# Patient Record
Sex: MALE | Race: WHITE | NOT HISPANIC OR LATINO | URBAN - METROPOLITAN AREA
[De-identification: names, ages, dates, MRNs, and addresses within clinical notes are randomized per-mention and may not be internally consistent; named-entity substitution may affect disease eponyms.]

---

## 2020-07-30 ENCOUNTER — INPATIENT (INPATIENT)
Facility: HOSPITAL | Age: 35
LOS: 1 days | Discharge: HOME | End: 2020-08-01
Attending: HOSPITALIST | Admitting: HOSPITALIST
Payer: COMMERCIAL

## 2020-07-30 VITALS
SYSTOLIC BLOOD PRESSURE: 131 MMHG | DIASTOLIC BLOOD PRESSURE: 101 MMHG | HEART RATE: 119 BPM | RESPIRATION RATE: 32 BRPM | OXYGEN SATURATION: 100 %

## 2020-07-30 DIAGNOSIS — Z90.89 ACQUIRED ABSENCE OF OTHER ORGANS: Chronic | ICD-10-CM

## 2020-07-30 LAB
ALBUMIN SERPL ELPH-MCNC: 4.4 G/DL — SIGNIFICANT CHANGE UP (ref 3.5–5.2)
ALP SERPL-CCNC: 77 U/L — SIGNIFICANT CHANGE UP (ref 30–115)
ALT FLD-CCNC: 29 U/L — SIGNIFICANT CHANGE UP (ref 0–41)
ANION GAP SERPL CALC-SCNC: 11 MMOL/L — SIGNIFICANT CHANGE UP (ref 7–14)
AST SERPL-CCNC: 23 U/L — SIGNIFICANT CHANGE UP (ref 0–41)
BASE EXCESS BLDV CALC-SCNC: -0.6 MMOL/L — SIGNIFICANT CHANGE UP (ref -2–2)
BASOPHILS # BLD AUTO: 0.11 K/UL — SIGNIFICANT CHANGE UP (ref 0–0.2)
BASOPHILS NFR BLD AUTO: 1 % — SIGNIFICANT CHANGE UP (ref 0–1)
BILIRUB SERPL-MCNC: 0.3 MG/DL — SIGNIFICANT CHANGE UP (ref 0.2–1.2)
BUN SERPL-MCNC: 15 MG/DL — SIGNIFICANT CHANGE UP (ref 10–20)
CA-I SERPL-SCNC: 1.21 MMOL/L — SIGNIFICANT CHANGE UP (ref 1.12–1.3)
CALCIUM SERPL-MCNC: 9.3 MG/DL — SIGNIFICANT CHANGE UP (ref 8.5–10.1)
CHLORIDE SERPL-SCNC: 104 MMOL/L — SIGNIFICANT CHANGE UP (ref 98–110)
CO2 SERPL-SCNC: 25 MMOL/L — SIGNIFICANT CHANGE UP (ref 17–32)
CREAT SERPL-MCNC: 1.2 MG/DL — SIGNIFICANT CHANGE UP (ref 0.7–1.5)
EOSINOPHIL # BLD AUTO: 0.68 K/UL — SIGNIFICANT CHANGE UP (ref 0–0.7)
EOSINOPHIL NFR BLD AUTO: 6.4 % — SIGNIFICANT CHANGE UP (ref 0–8)
GAS PNL BLDV: 141 MMOL/L — SIGNIFICANT CHANGE UP (ref 136–145)
GAS PNL BLDV: SIGNIFICANT CHANGE UP
GLUCOSE SERPL-MCNC: 133 MG/DL — HIGH (ref 70–99)
HCO3 BLDV-SCNC: 26 MMOL/L — SIGNIFICANT CHANGE UP (ref 22–29)
HCT VFR BLD CALC: 43.3 % — SIGNIFICANT CHANGE UP (ref 42–52)
HCT VFR BLDA CALC: 46.8 % — HIGH (ref 34–44)
HGB BLD CALC-MCNC: 15.3 G/DL — SIGNIFICANT CHANGE UP (ref 14–18)
HGB BLD-MCNC: 14.8 G/DL — SIGNIFICANT CHANGE UP (ref 14–18)
HOROWITZ INDEX BLDV+IHG-RTO: 100 — SIGNIFICANT CHANGE UP
IMM GRANULOCYTES NFR BLD AUTO: 0.4 % — HIGH (ref 0.1–0.3)
LACTATE BLDV-MCNC: 1.6 MMOL/L — SIGNIFICANT CHANGE UP (ref 0.5–1.6)
LYMPHOCYTES # BLD AUTO: 2.53 K/UL — SIGNIFICANT CHANGE UP (ref 1.2–3.4)
LYMPHOCYTES # BLD AUTO: 23.8 % — SIGNIFICANT CHANGE UP (ref 20.5–51.1)
MCHC RBC-ENTMCNC: 31.2 PG — HIGH (ref 27–31)
MCHC RBC-ENTMCNC: 34.2 G/DL — SIGNIFICANT CHANGE UP (ref 32–37)
MCV RBC AUTO: 91.2 FL — SIGNIFICANT CHANGE UP (ref 80–94)
MONOCYTES # BLD AUTO: 0.61 K/UL — HIGH (ref 0.1–0.6)
MONOCYTES NFR BLD AUTO: 5.7 % — SIGNIFICANT CHANGE UP (ref 1.7–9.3)
NEUTROPHILS # BLD AUTO: 6.65 K/UL — HIGH (ref 1.4–6.5)
NEUTROPHILS NFR BLD AUTO: 62.7 % — SIGNIFICANT CHANGE UP (ref 42.2–75.2)
NRBC # BLD: 0 /100 WBCS — SIGNIFICANT CHANGE UP (ref 0–0)
PCO2 BLDV: 49 MMHG — SIGNIFICANT CHANGE UP (ref 41–51)
PH BLDV: 7.33 — SIGNIFICANT CHANGE UP (ref 7.26–7.43)
PLATELET # BLD AUTO: 281 K/UL — SIGNIFICANT CHANGE UP (ref 130–400)
PO2 BLDV: 36 MMHG — SIGNIFICANT CHANGE UP (ref 20–40)
POTASSIUM BLDV-SCNC: 4.1 MMOL/L — SIGNIFICANT CHANGE UP (ref 3.3–5.6)
POTASSIUM SERPL-MCNC: 4.2 MMOL/L — SIGNIFICANT CHANGE UP (ref 3.5–5)
POTASSIUM SERPL-SCNC: 4.2 MMOL/L — SIGNIFICANT CHANGE UP (ref 3.5–5)
PROT SERPL-MCNC: 7 G/DL — SIGNIFICANT CHANGE UP (ref 6–8)
RBC # BLD: 4.75 M/UL — SIGNIFICANT CHANGE UP (ref 4.7–6.1)
RBC # FLD: 11.8 % — SIGNIFICANT CHANGE UP (ref 11.5–14.5)
SAO2 % BLDV: 68 % — SIGNIFICANT CHANGE UP
SARS-COV-2 RNA SPEC QL NAA+PROBE: SIGNIFICANT CHANGE UP
SODIUM SERPL-SCNC: 140 MMOL/L — SIGNIFICANT CHANGE UP (ref 135–146)
WBC # BLD: 10.62 K/UL — SIGNIFICANT CHANGE UP (ref 4.8–10.8)
WBC # FLD AUTO: 10.62 K/UL — SIGNIFICANT CHANGE UP (ref 4.8–10.8)

## 2020-07-30 PROCEDURE — 99223 1ST HOSP IP/OBS HIGH 75: CPT

## 2020-07-30 PROCEDURE — 99291 CRITICAL CARE FIRST HOUR: CPT

## 2020-07-30 PROCEDURE — 71045 X-RAY EXAM CHEST 1 VIEW: CPT | Mod: 26

## 2020-07-30 PROCEDURE — 93970 EXTREMITY STUDY: CPT | Mod: 26

## 2020-07-30 RX ORDER — IPRATROPIUM/ALBUTEROL SULFATE 18-103MCG
3 AEROSOL WITH ADAPTER (GRAM) INHALATION ONCE
Refills: 0 | Status: COMPLETED | OUTPATIENT
Start: 2020-07-30 | End: 2020-07-30

## 2020-07-30 RX ORDER — EPINEPHRINE 0.3 MG/.3ML
0.3 INJECTION INTRAMUSCULAR; SUBCUTANEOUS ONCE
Refills: 0 | Status: COMPLETED | OUTPATIENT
Start: 2020-07-30 | End: 2020-07-30

## 2020-07-30 RX ORDER — ENOXAPARIN SODIUM 100 MG/ML
40 INJECTION SUBCUTANEOUS DAILY
Refills: 0 | Status: DISCONTINUED | OUTPATIENT
Start: 2020-07-30 | End: 2020-08-01

## 2020-07-30 RX ORDER — PANTOPRAZOLE SODIUM 20 MG/1
40 TABLET, DELAYED RELEASE ORAL
Refills: 0 | Status: DISCONTINUED | OUTPATIENT
Start: 2020-07-30 | End: 2020-08-01

## 2020-07-30 RX ORDER — BUDESONIDE AND FORMOTEROL FUMARATE DIHYDRATE 160; 4.5 UG/1; UG/1
2 AEROSOL RESPIRATORY (INHALATION)
Refills: 0 | Status: DISCONTINUED | OUTPATIENT
Start: 2020-07-30 | End: 2020-08-01

## 2020-07-30 RX ORDER — CHLORHEXIDINE GLUCONATE 213 G/1000ML
1 SOLUTION TOPICAL
Refills: 0 | Status: DISCONTINUED | OUTPATIENT
Start: 2020-07-30 | End: 2020-08-01

## 2020-07-30 RX ORDER — IPRATROPIUM/ALBUTEROL SULFATE 18-103MCG
3 AEROSOL WITH ADAPTER (GRAM) INHALATION EVERY 6 HOURS
Refills: 0 | Status: DISCONTINUED | OUTPATIENT
Start: 2020-07-30 | End: 2020-08-01

## 2020-07-30 RX ADMIN — BUDESONIDE AND FORMOTEROL FUMARATE DIHYDRATE 2 PUFF(S): 160; 4.5 AEROSOL RESPIRATORY (INHALATION) at 21:18

## 2020-07-30 RX ADMIN — Medication 3 MILLILITER(S): at 09:56

## 2020-07-30 RX ADMIN — Medication 60 MILLIGRAM(S): at 17:09

## 2020-07-30 RX ADMIN — ENOXAPARIN SODIUM 40 MILLIGRAM(S): 100 INJECTION SUBCUTANEOUS at 17:09

## 2020-07-30 RX ADMIN — Medication 3 MILLILITER(S): at 09:55

## 2020-07-30 RX ADMIN — Medication 3 MILLILITER(S): at 19:25

## 2020-07-30 RX ADMIN — Medication 60 MILLIGRAM(S): at 21:15

## 2020-07-30 RX ADMIN — EPINEPHRINE 0.3 MILLIGRAM(S): 0.3 INJECTION INTRAMUSCULAR; SUBCUTANEOUS at 09:56

## 2020-07-30 RX ADMIN — Medication 3 MILLILITER(S): at 15:59

## 2020-07-30 NOTE — ED PROVIDER NOTE - NS ED ROS FT
Constitutional: (-) fever  Eyes/ENT: (-) runny nose  Cardiovascular: (-) chest pain, (-) syncope  Respiratory: (+) cough, (+) shortness of breath  Gastrointestinal: (-) vomiting, (-) diarrhea, (-) abdominal pain  : (-) dysuria   Musculoskeletal: (-) back pain, (-) joint pain  Integumentary: (-) rash  Neurological: (-)loc  Allergic/Immunologic: (-) pruritus  Endocrine: (-) history of thyroid disease

## 2020-07-30 NOTE — ED PROVIDER NOTE - PROGRESS NOTE DETAILS
Nathaniel:  speaking in full sentences on Bipap SC: PT with status asthmaticus arrived s/p IM epi, solumedrol, and magnesium by EMS. Given IM epi and bipap here.  Now speaking in full sentences. Labs and CXR reassuring. Patient to be admitted to an inpatient floor. Case discussed with and care endorsed to medical admitting resident. Admitting physician notified. ATTENDING NOTE: 34 y/o M PMH Asthma comes in c/o severe SOB. Pt was given steroids and epinephrine along with Bipap PTA. Vital signs noted. Pt in moderate distress. (+) B/L wheezing. Bipap continued. Pt eventually had less respiratory distress, breath sounds improved. Diagnostic testing reviewed. Will admit to ICU.

## 2020-07-30 NOTE — H&P ADULT - NSHPLABSRESULTS_GEN_ALL_CORE
14.8   10.62 )-----------( 281      ( 30 Jul 2020 09:30 )             43.3       07-30    140  |  104  |  15  ----------------------------<  133<H>  4.2   |  25  |  1.2    Ca    9.3      30 Jul 2020 09:30    TPro  7.0  /  Alb  4.4  /  TBili  0.3  /  DBili  x   /  AST  23  /  ALT  29  /  AlkPhos  77  07-30      Blood Gas Profile - Venous (07.30.20 @ 09:53)    pH, Venous: 7.33    pCO2, Venous: 49 mmHg    pO2, Venous: 36 mmHg    HCO3, Venous: 26 mmoL/L    Base Excess, Venous: -0.6 mmoL/L    Oxygen Saturation, Venous: 68 %    FIO2, Venous: 100        < from: Xray Chest 1 View-PORTABLE IMMEDIATE (07.30.20 @ 10:30) >    No radiographic evidence of acute cardiopulmonary disease.    < end of copied text >

## 2020-07-30 NOTE — ED PROVIDER NOTE - PHYSICAL EXAMINATION
CONSTITUTIONAL: Well-developed; well-nourished; in no acute distress.   SKIN: warm, dry  HEAD: Normocephalic.  EYES: PERRL, EOMI.  ENT: Airway clear.  NECK: Supple.  LYMPH: No acute cervical adenopathy.  CARD: No murmurs, rubs or gallops. Regular rate and rhythm.   RESP: No wheezing, rales or rhonchi. Wheezing, increased work of breathing improved with bipap. Speaking in 3-5 work sentences s/p bipap.  ABD: soft ntnd  EXT: No clubbing, cyanosis.   NEURO: Alert, oriented.  PSYCH: Cooperative, appropriate.

## 2020-07-30 NOTE — ED ADULT TRIAGE NOTE - CHIEF COMPLAINT QUOTE
BIBA on cpap ems states "asthma exacerbation 0.3mg epi IM, 125mg solumedrol IM, 2mg Magnesium, 3 combivants all given and placed on cpap pulse ox 100%".

## 2020-07-30 NOTE — H&P ADULT - HISTORY OF PRESENT ILLNESS
35 years old male from home with PMHx of asthma, presented to the ED with sudden episode of shortness of breath this morning. Patient stated that for the past 2 months he has not been feeling well. In the last 2 weeks, he had daily asthma attacks, 2-3 times per day, each episode lasted about 1 hour. There is no triggering factors that he can pinpoint and the episode happened even when he was not exerting himself. He complains of increase cough and sputum production, chest pressure and rib soreness during those attacks. He has been using more inhalers and nebulizers that he received from other family members but the symptoms did not improved. This morning he woke up with severe symptoms and the nebulizer did not help at all, so he called EMS. He received IM Epi, Solemedrol and IV magnesium en route and was placed on BiPAP. He denies any fever/chills, abdominal pain, nausea/vomiting, diarrhea/constipation, or urinary symptoms.    He was diagnosed with asthma when he was at a very young age; he was not on chronic steroid but recently he did get short courses of treatment; he was never intubated for asthma exacerbation. He did not see a pulmonologist routinely, he gets his medication from family members. He was supposed to see Dr. Almanzar today.      T(C): 37.4 (30 Jul 2020 09:52), Max: 37.4 (30 Jul 2020 09:52)  T(F): 99.4 (30 Jul 2020 09:52), Max: 99.4 (30 Jul 2020 09:52)  HR: 84 (30 Jul 2020 12:45) (81 - 119)  BP: 130/78 (30 Jul 2020 12:45) (130/78 - 140/67)  RR: 18 (30 Jul 2020 12:45) (18 - 32)  SpO2: 100% (30 Jul 2020 12:45) (100% - 100%)

## 2020-07-30 NOTE — ED PROVIDER NOTE - CRITICAL CARE PROVIDED
consult w/ pt's family directly relating to pts condition/direct patient care (not related to procedure)/interpretation of diagnostic studies/consultation with other physicians/documentation/additional history taking

## 2020-07-30 NOTE — H&P ADULT - ASSESSMENT
35 years old male from home with PMHx of asthma, presented to the ED with sudden episode of shortness of breath; Admitted for acute hypoxemic respiratory failure secondary to status asthmaticus.    # Acute hypoxemic respiratory failure secondary to status asthmaticus  - Improved with BiPAP, currently not in severe respiratory distress; likely secondary to poorly managed disease  - No signs of PNA, CXR shows no focal consolidation; no need for antibiotics  - Continue BiPAP for now and repeat blood gas; if PCO2 decreases then will try to take patient off  - Continue IV Solumedrol 60mg q8hrs for now  - Start Symbicort and Duoneb q6hrs or PRN  - SARS-CoV-2 PCR negative; will check for RVP and Flu PCR  - Check aspergillus antibodies as patient is a chronic asthmatics  - Check LE duplex  - Oxygen therapy as needed, keep O2 > 92%  - Low threshold for intubation      DVT ppx: Lovenox  GI ppx: Protonix  Diet: Regular  Activity: OOBTC  Ambulatory status: Ambulate without assistance  Lines: Peripheral IVs  Code status: FULL CODE  Dispo: MICU monitoring

## 2020-07-30 NOTE — H&P ADULT - NSHPPHYSICALEXAM_GEN_ALL_CORE
GENERAL: In mild respiratory distress, on BiPAP  HEAD: Atraumatic, Normocephalic  EYES: EOMI, PERRLA, conjunctiva pink and cornea white  ENT: Normal external ears and nose, no discharges; moist mucous membranes, no erythema on posterior oropharynx  NECK: Supple, nontender to palpation; no JVD  CHEST/LUNG: Diffuse wheezing on exam; able to speak in full sentences  HEART: Regular rate and rhythm; No murmurs, rubs, or gallops  ABDOMEN: Soft, nontender, nondistended; no rebound tenderness, no guarding; no hepatomegaly; normoactive bowel sounds  EXTREMITIES:  2+ Peripheral Pulses, brisk capillary refill. No clubbing, cyanosis, or petal edema  NERVOUS SYSTEM: Alert and oriented to person, time, place and situation, speech clear. No focal deficits   MSK: FROM all 4 extremities, full and equal strength  SKIN: No rashes or lesions

## 2020-07-30 NOTE — ED PROVIDER NOTE - OBJECTIVE STATEMENT
35M with pmh of asthma, + admissions - prior intubations presents in status asthmaticus, s/p IM epi x1, Mg 2g, and solumedrol 125 mg by EMS PTA. Sx of severe intensity, gradually worsening onset, no provoking or alleviating factors. PT speaking in 1-2 word sentences on bipap, satting well, with mildly increased work of breathing. Another 0.3 mg epi IM given in ED. States that he was recently diagnosed with PNA. Denies fever, chills, CP, n/v/d, abd pain, travel, exposure to known COVID PTs, LE pain or swelling.

## 2020-07-30 NOTE — ED PROVIDER NOTE - CLINICAL SUMMARY MEDICAL DECISION MAKING FREE TEXT BOX
impending respiratory failure on initial evaluation.  Good response to ED tx.  sx improved. pt will be admitted to ICU.

## 2020-07-30 NOTE — ED ADULT NURSE NOTE - BREATHING INTERVENTIONS
Oxygen/pt placed on BiPap upon arrival to unit, setting 16/6, rate 18, Fio2 40$, pt reports improvement in shortness of breath while on Bipap

## 2020-07-30 NOTE — H&P ADULT - NSHPSOCIALHISTORY_GEN_ALL_CORE
Former smoker; quit 5 years ago; 16-pack-year history  Social drinker  Daily Cannibis user, 2 joints per day for 15 years

## 2020-07-31 LAB
ALBUMIN SERPL ELPH-MCNC: 4.5 G/DL — SIGNIFICANT CHANGE UP (ref 3.5–5.2)
ALP SERPL-CCNC: 69 U/L — SIGNIFICANT CHANGE UP (ref 30–115)
ALT FLD-CCNC: 25 U/L — SIGNIFICANT CHANGE UP (ref 0–41)
ANION GAP SERPL CALC-SCNC: 11 MMOL/L — SIGNIFICANT CHANGE UP (ref 7–14)
ANION GAP SERPL CALC-SCNC: 14 MMOL/L — SIGNIFICANT CHANGE UP (ref 7–14)
AST SERPL-CCNC: 19 U/L — SIGNIFICANT CHANGE UP (ref 0–41)
BASOPHILS # BLD AUTO: 0.02 K/UL — SIGNIFICANT CHANGE UP (ref 0–0.2)
BASOPHILS NFR BLD AUTO: 0.1 % — SIGNIFICANT CHANGE UP (ref 0–1)
BILIRUB SERPL-MCNC: 0.4 MG/DL — SIGNIFICANT CHANGE UP (ref 0.2–1.2)
BUN SERPL-MCNC: 16 MG/DL — SIGNIFICANT CHANGE UP (ref 10–20)
BUN SERPL-MCNC: 18 MG/DL — SIGNIFICANT CHANGE UP (ref 10–20)
CALCIUM SERPL-MCNC: 10.1 MG/DL — SIGNIFICANT CHANGE UP (ref 8.5–10.1)
CALCIUM SERPL-MCNC: 9.8 MG/DL — SIGNIFICANT CHANGE UP (ref 8.5–10.1)
CHLORIDE SERPL-SCNC: 103 MMOL/L — SIGNIFICANT CHANGE UP (ref 98–110)
CHLORIDE SERPL-SCNC: 103 MMOL/L — SIGNIFICANT CHANGE UP (ref 98–110)
CO2 SERPL-SCNC: 20 MMOL/L — SIGNIFICANT CHANGE UP (ref 17–32)
CO2 SERPL-SCNC: 25 MMOL/L — SIGNIFICANT CHANGE UP (ref 17–32)
CREAT SERPL-MCNC: 0.9 MG/DL — SIGNIFICANT CHANGE UP (ref 0.7–1.5)
CREAT SERPL-MCNC: 1 MG/DL — SIGNIFICANT CHANGE UP (ref 0.7–1.5)
EOSINOPHIL # BLD AUTO: 0 K/UL — SIGNIFICANT CHANGE UP (ref 0–0.7)
EOSINOPHIL NFR BLD AUTO: 0 % — SIGNIFICANT CHANGE UP (ref 0–8)
GLUCOSE SERPL-MCNC: 121 MG/DL — HIGH (ref 70–99)
GLUCOSE SERPL-MCNC: 139 MG/DL — HIGH (ref 70–99)
HCT VFR BLD CALC: 44 % — SIGNIFICANT CHANGE UP (ref 42–52)
HGB BLD-MCNC: 14.6 G/DL — SIGNIFICANT CHANGE UP (ref 14–18)
IMM GRANULOCYTES NFR BLD AUTO: 0.7 % — HIGH (ref 0.1–0.3)
LYMPHOCYTES # BLD AUTO: 1.74 K/UL — SIGNIFICANT CHANGE UP (ref 1.2–3.4)
LYMPHOCYTES # BLD AUTO: 11.1 % — LOW (ref 20.5–51.1)
MAGNESIUM SERPL-MCNC: 2.1 MG/DL — SIGNIFICANT CHANGE UP (ref 1.8–2.4)
MCHC RBC-ENTMCNC: 30.5 PG — SIGNIFICANT CHANGE UP (ref 27–31)
MCHC RBC-ENTMCNC: 33.2 G/DL — SIGNIFICANT CHANGE UP (ref 32–37)
MCV RBC AUTO: 92.1 FL — SIGNIFICANT CHANGE UP (ref 80–94)
MONOCYTES # BLD AUTO: 0.4 K/UL — SIGNIFICANT CHANGE UP (ref 0.1–0.6)
MONOCYTES NFR BLD AUTO: 2.6 % — SIGNIFICANT CHANGE UP (ref 1.7–9.3)
NEUTROPHILS # BLD AUTO: 13.36 K/UL — HIGH (ref 1.4–6.5)
NEUTROPHILS NFR BLD AUTO: 85.5 % — HIGH (ref 42.2–75.2)
NRBC # BLD: 0 /100 WBCS — SIGNIFICANT CHANGE UP (ref 0–0)
PLATELET # BLD AUTO: 306 K/UL — SIGNIFICANT CHANGE UP (ref 130–400)
POTASSIUM SERPL-MCNC: 4.6 MMOL/L — SIGNIFICANT CHANGE UP (ref 3.5–5)
POTASSIUM SERPL-MCNC: 5.4 MMOL/L — HIGH (ref 3.5–5)
POTASSIUM SERPL-SCNC: 4.6 MMOL/L — SIGNIFICANT CHANGE UP (ref 3.5–5)
POTASSIUM SERPL-SCNC: 5.4 MMOL/L — HIGH (ref 3.5–5)
PROT SERPL-MCNC: 7 G/DL — SIGNIFICANT CHANGE UP (ref 6–8)
RAPID RVP RESULT: SIGNIFICANT CHANGE UP
RBC # BLD: 4.78 M/UL — SIGNIFICANT CHANGE UP (ref 4.7–6.1)
RBC # FLD: 11.9 % — SIGNIFICANT CHANGE UP (ref 11.5–14.5)
SARS-COV-2 IGG SERPL QL IA: NEGATIVE — SIGNIFICANT CHANGE UP
SARS-COV-2 IGM SERPL IA-ACNC: <0.1 INDEX — SIGNIFICANT CHANGE UP
SODIUM SERPL-SCNC: 137 MMOL/L — SIGNIFICANT CHANGE UP (ref 135–146)
SODIUM SERPL-SCNC: 139 MMOL/L — SIGNIFICANT CHANGE UP (ref 135–146)
WBC # BLD: 15.63 K/UL — HIGH (ref 4.8–10.8)
WBC # FLD AUTO: 15.63 K/UL — HIGH (ref 4.8–10.8)

## 2020-07-31 PROCEDURE — 99233 SBSQ HOSP IP/OBS HIGH 50: CPT

## 2020-07-31 PROCEDURE — 71045 X-RAY EXAM CHEST 1 VIEW: CPT | Mod: 26

## 2020-07-31 RX ORDER — ACETAMINOPHEN 500 MG
650 TABLET ORAL EVERY 6 HOURS
Refills: 0 | Status: DISCONTINUED | OUTPATIENT
Start: 2020-07-31 | End: 2020-08-01

## 2020-07-31 RX ORDER — SODIUM ZIRCONIUM CYCLOSILICATE 10 G/10G
10 POWDER, FOR SUSPENSION ORAL
Refills: 0 | Status: COMPLETED | OUTPATIENT
Start: 2020-07-31 | End: 2020-08-01

## 2020-07-31 RX ORDER — ALBUTEROL 90 UG/1
2 AEROSOL, METERED ORAL EVERY 6 HOURS
Refills: 0 | Status: DISCONTINUED | OUTPATIENT
Start: 2020-07-31 | End: 2020-08-01

## 2020-07-31 RX ADMIN — BUDESONIDE AND FORMOTEROL FUMARATE DIHYDRATE 2 PUFF(S): 160; 4.5 AEROSOL RESPIRATORY (INHALATION) at 20:01

## 2020-07-31 RX ADMIN — Medication 3 MILLILITER(S): at 14:18

## 2020-07-31 RX ADMIN — PANTOPRAZOLE SODIUM 40 MILLIGRAM(S): 20 TABLET, DELAYED RELEASE ORAL at 05:25

## 2020-07-31 RX ADMIN — Medication 3 MILLILITER(S): at 09:36

## 2020-07-31 RX ADMIN — Medication 3 MILLILITER(S): at 02:16

## 2020-07-31 RX ADMIN — Medication 3 MILLILITER(S): at 19:31

## 2020-07-31 RX ADMIN — Medication 60 MILLIGRAM(S): at 05:25

## 2020-07-31 RX ADMIN — CHLORHEXIDINE GLUCONATE 1 APPLICATION(S): 213 SOLUTION TOPICAL at 05:26

## 2020-07-31 RX ADMIN — SODIUM ZIRCONIUM CYCLOSILICATE 10 GRAM(S): 10 POWDER, FOR SUSPENSION ORAL at 18:05

## 2020-07-31 RX ADMIN — BUDESONIDE AND FORMOTEROL FUMARATE DIHYDRATE 2 PUFF(S): 160; 4.5 AEROSOL RESPIRATORY (INHALATION) at 08:07

## 2020-07-31 RX ADMIN — Medication 60 MILLIGRAM(S): at 18:03

## 2020-07-31 NOTE — CONSULT NOTE ADULT - SUBJECTIVE AND OBJECTIVE BOX
Patient is a 35y old  Male who presents with a chief complaint of Status asthmaticus (30 Jul 2020 13:15)      HPI:  35 years old male from home with PMHx of asthma, presented to the ED with sudden episode of shortness of breath this morning. Patient stated that for the past 2 months he has not been feeling well. In the last 2 weeks, he had daily asthma attacks, 2-3 times per day, each episode lasted about 1 hour. There is no triggering factors that he can pinpoint and the episode happened even when he was not exerting himself. He complains of increase cough and sputum production, chest pressure and rib soreness during those attacks. He has been using more inhalers and nebulizers that he received from other family members but the symptoms did not improved. This morning he woke up with severe symptoms and the nebulizer did not help at all, so he called EMS. He received IM Epi, Solemedrol and IV magnesium en route and was placed on BiPAP. He denies any fever/chills, abdominal pain, nausea/vomiting, diarrhea/constipation, or urinary symptoms.    He was diagnosed with asthma when he was at a very young age; he was not on chronic steroid but recently he did get short courses of treatment; he was never intubated for asthma exacerbation. He did not see a pulmonologist routinely, he gets his medication from family members. He was supposed to see Dr. Almanzar today.  said been using the ventolin every day for last couple of monhts     T(C): 37.4 (30 Jul 2020 09:52), Max: 37.4 (30 Jul 2020 09:52)  T(F): 99.4 (30 Jul 2020 09:52), Max: 99.4 (30 Jul 2020 09:52)  HR: 84 (30 Jul 2020 12:45) (81 - 119)  BP: 130/78 (30 Jul 2020 12:45) (130/78 - 140/67)  RR: 18 (30 Jul 2020 12:45) (18 - 32)  SpO2: 100% (30 Jul 2020 12:45) (100% - 100%) (30 Jul 2020 13:15)      PAST MEDICAL & SURGICAL HISTORY:  Asthma  History of tonsillectomy    Allergies    No Known Allergies    Intolerances      Family history : no cardiovscular family history   Home Medications:  albuterol:  (30 Jul 2020 09:51)    Occupation:  Alochol: Denied  Smoking: Denied  Drug Use: Denied  Marital Status:         ROS: as in HPI; All other systems reviewed are negative    ICU Vital Signs Last 24 Hrs  T(C): 36.1 (31 Jul 2020 07:01), Max: 37.4 (30 Jul 2020 09:52)  T(F): 96.9 (31 Jul 2020 07:01), Max: 99.4 (30 Jul 2020 09:52)  HR: 88 (31 Jul 2020 07:01) (65 - 119)  BP: 113/78 (31 Jul 2020 07:01) (108/63 - 144/79)  BP(mean): 91 (31 Jul 2020 07:01) (79 - 96)  ABP: --  ABP(mean): --  RR: 14 (31 Jul 2020 07:01) (14 - 35)  SpO2: 93% (31 Jul 2020 07:01) (92% - 100%)        Physical Examination:    General: No acute distress.  Alert, oriented, interactive, nonfocal    HEENT: Pupils equal, reactive to light.  Symmetric.    PULM: Clear to auscultation bilaterally, no significant sputum production  now no wheezing   CVS: Regular rate and rhythm, no murmurs, rubs, or gallops    ABD: Soft, nondistended, nontender, normoactive bowel sounds, no masses    EXT: No edema, nontender, no clubbing     SKIN: Warm and well perfused, no rashes noted.    Neurology : no motor or sensory deficit     Musculoskeletal : move all extremity     Lymphatic system: no Palpable node               I&O's Detail    30 Jul 2020 07:01  -  31 Jul 2020 07:00  --------------------------------------------------------  IN:  Total IN: 0 mL    OUT:    Voided: 850 mL  Total OUT: 850 mL    Total NET: -850 mL            LABS:                        14.6   15.63 )-----------( 306      ( 31 Jul 2020 05:59 )             44.0     31 Jul 2020 05:59    139    |  103    |  16     ----------------------------<  139    5.4     |  25     |  0.9      Ca    9.8        31 Jul 2020 05:59  Mg     2.1       31 Jul 2020 05:59    TPro  7.0    /  Alb  4.5    /  TBili  0.4    /  DBili  x      /  AST  19     /  ALT  25     /  AlkPhos  69     31 Jul 2020 05:59  Amylase x     lipase x              CAPILLARY BLOOD GLUCOSE            Culture        MEDICATIONS  (STANDING):  albuterol/ipratropium for Nebulization 3 milliLiter(s) Nebulizer every 6 hours  budesonide 160 MICROgram(s)/formoterol 4.5 MICROgram(s) Inhaler 2 Puff(s) Inhalation two times a day  chlorhexidine 4% Liquid 1 Application(s) Topical <User Schedule>  enoxaparin Injectable 40 milliGRAM(s) SubCutaneous daily  methylPREDNISolone sodium succinate Injectable 60 milliGRAM(s) IV Push every 8 hours  pantoprazole    Tablet 40 milliGRAM(s) Oral before breakfast    MEDICATIONS  (PRN):        RADIOLOGY: ***     CXR: no infiltrate   TLC:  OG:  ET tube:        CAM ICU:  ECHO:

## 2020-07-31 NOTE — CONSULT NOTE ADULT - ASSESSMENT
IMPRESSION:  ARF secondary to status asthmaticus today feel better       PLAN:    CNS: no sedation     HEENT: holley    PULMONARY: lower solumedrol to 60 mg Q 12 hrs   symbicort 160 mg Q 12 hrs   albuterol Q 4 hrs and prn     CARDIOVASCULAR:  keep is = os     GI: GI prophylaxis.  Feeding     RENAL: follow lytes     INFECTIOUS DISEASE: no abx follow cx     HEMATOLOGICAL:  DVT prophylaxis.    ENDOCRINE:  Follow up FS.  Insulin protocol if needed.    transfer to floor possible d/c in 24 hrs on prednisone 60 mg daily and taper by 20 mg every 3rd day   symbicort Q 12 hrs 160 mg for now until seen as outpatient   ventolin as needed   social service to help with patient getting the inhaler   follow in our office in 2 weeks           CRITICAL CARE TIME SPENT: *** IMPRESSION:  ARF secondary to status asthmaticus today feel better       PLAN:    CNS: no sedation     HEENT: holley    PULMONARY: lower solumedrol to 60 mg Q 12 hrs   symbicort 160 mg Q 12 hrs   albuterol Q 4 hrs and prn     CARDIOVASCULAR:  keep is = os     GI: GI prophylaxis.  Feeding     RENAL: follow lytes     INFECTIOUS DISEASE: no abx follow cx     HEMATOLOGICAL:  DVT prophylaxis.    ENDOCRINE:  Follow up FS.  Insulin protocol if needed.    r possible d/c in 48  hrs on prednisone 60 mg daily and taper by 20 mg every 3rd day   symbicort Q 12 hrs 160 mg for now until seen as outpatient   ventolin as needed   social service to help with patient getting the inhaler   follow in our office in 2 weeks           CRITICAL CARE TIME SPENT: ***

## 2020-07-31 NOTE — CHART NOTE - NSCHARTNOTEFT_GEN_A_CORE
ICU DOWNGRADE NOTE:    35y Male transferred to floor from ICU    Patient is a 35y old Male who presents with a chief complaint of shortness of breath    The patient is currently admitted for the primary diagnosis of acute hypoxemic respiratory failure secondary to acute asthma exacerbation    The patient was admitted to the unit for 1 day.    The patient was never intubated or on any pressors    Indwelling vascular catheters: Peripheral IVs     Urinary Catheter: None    Disposition: Downgrade to medical floor    Code Status: FULL CODE    ICU COURSE OF EVENTS:  -------------------------------------------------------------------------------------------  35 years old male from home with PMHx of asthma, presented to the ED with sudden episode of shortness of breath; Admitted for acute hypoxemic respiratory failure secondary to status asthmaticus.    # Acute hypoxemic respiratory failure secondary to status asthmaticus  - Improved with BiPAP, currently breathing much better; likely secondary to poorly managed disease and noncompliance  - No signs of PNA, CXR shows no focal consolidation; no need for antibiotics  - Continue IV Solumedrol 60mg q12hrs for now  - Continue Symbicort 160mg q12hrs, Duoneb q6hrs and albuterol PRN  - SARS-CoV-2 PCR negative; RVP negative  - Check aspergillus antibodies as patient is a chronic asthmatics  - Check LE duplex  - Oxygen therapy as needed, keep O2 > 92%  - BiPAP as needed  - Education on cutting down Cannibis use and having regular pulmonary/PCP follow up  - Need social service assistance on getting inhalers outpatient      -------------------------------------------------------------------------------------------    Current workup in progress:  - Follow up LE duplex  - Follow up Aspergillus antibodies  - Follow up on social service to get inhalers outpatient    SIGN OUT AT 07-31-20 @ 11:00 GIVEN TO: Dr. Geovanny Chow ICU DOWNGRADE NOTE:    35y Male transferred to floor from ICU    Patient is a 35y old Male who presents with a chief complaint of shortness of breath    The patient is currently admitted for the primary diagnosis of acute hypoxemic respiratory failure secondary to acute asthma exacerbation    The patient was admitted to the unit for 1 day.    The patient was never intubated or on any pressors    Indwelling vascular catheters: Peripheral IVs     Urinary Catheter: None    Disposition: Downgrade to medical floor    Code Status: FULL CODE    ICU COURSE OF EVENTS:  -------------------------------------------------------------------------------------------  35 years old male from home with PMHx of asthma, presented to the ED with sudden episode of shortness of breath; Admitted for acute hypoxemic respiratory failure secondary to status asthmaticus.    # Acute hypoxemic respiratory failure secondary to status asthmaticus  - Improved with BiPAP, currently breathing much better; likely secondary to poorly managed disease and noncompliance  - No signs of PNA, CXR shows no focal consolidation; no need for antibiotics  - Continue IV Solumedrol 60mg q12hrs for now  - Continue Symbicort 160mg q12hrs, Duoneb q6hrs and albuterol PRN  - SARS-CoV-2 PCR negative; RVP negative  - Check aspergillus antibodies as patient is a chronic asthmatics  - Check LE duplex  - Oxygen therapy as needed, keep O2 > 92%  - BiPAP as needed  - Education on cutting down Cannibis use and having regular pulmonary/PCP follow up  - Need social service assistance on getting inhalers outpatient      -------------------------------------------------------------------------------------------    Current workup in progress:  - Follow up LE duplex  - Follow up Aspergillus antibodies  - Follow up on social service to get inhalers outpatient  - Follow up BMP 15:00 for mild hyperkalemia    SIGN OUT AT 07-31-20 @ 11:00 GIVEN TO: Dr. Geovanny Chow

## 2020-07-31 NOTE — PROGRESS NOTE ADULT - ASSESSMENT
MELANIE CACERES 35y Male  MRN#: [id number]   CODE STATUS: FULL CODE    SUBJECTIVE  Patient is a 35y old Male who presented with a chief complaint of shortness of breath  Currently admitted to medicine with the primary diagnosis of acute asthma exacerbation  Today is hospital day 2d, and this morning he is resting comfortably and reports no overnight events. Patient is breathing much better today with mild wheezing. No need for BiPAP overnight.         OBJECTIVE  PAST MEDICAL & SURGICAL HISTORY  Asthma  History of tonsillectomy    ALLERGIES:  No Known Allergies      HOME MEDICATIONS:  HOME MEDICATIONS:  albuterol:  (30 Jul 2020 09:51)      MEDICATIONS:  STANDING MEDICATIONS  albuterol/ipratropium for Nebulization 3 milliLiter(s) Nebulizer every 6 hours  budesonide 160 MICROgram(s)/formoterol 4.5 MICROgram(s) Inhaler 2 Puff(s) Inhalation two times a day  chlorhexidine 4% Liquid 1 Application(s) Topical <User Schedule>  enoxaparin Injectable 40 milliGRAM(s) SubCutaneous daily  methylPREDNISolone sodium succinate Injectable 60 milliGRAM(s) IV Push every 12 hours  pantoprazole    Tablet 40 milliGRAM(s) Oral before breakfast    PRN MEDICATIONS  ALBUTerol    90 MICROgram(s) HFA Inhaler 2 Puff(s) Inhalation every 6 hours PRN      VITAL SIGNS: Last 24 Hours  T(C): 36.1 (31 Jul 2020 07:01), Max: 37.2 (30 Jul 2020 16:00)  T(F): 96.9 (31 Jul 2020 07:01), Max: 99 (30 Jul 2020 16:00)  HR: 95 (31 Jul 2020 09:00) (65 - 100)  BP: 132/76 (31 Jul 2020 09:00) (108/63 - 144/79)  BP(mean): 98 (31 Jul 2020 09:00) (79 - 98)  RR: 16 (31 Jul 2020 09:00) (14 - 35)  SpO2: 93% (31 Jul 2020 09:00) (92% - 100%)    LABS:                        14.6   15.63 )-----------( 306      ( 31 Jul 2020 05:59 )             44.0     07-31    139  |  103  |  16  ----------------------------<  139<H>  5.4<H>   |  25  |  0.9    Ca    9.8      31 Jul 2020 05:59  Mg     2.1     07-31    TPro  7.0  /  Alb  4.5  /  TBili  0.4  /  DBili  x   /  AST  19  /  ALT  25  /  AlkPhos  69  07-31    RADIOLOGY:    < from: Xray Chest 1 View- PORTABLE-Routine (07.31.20 @ 05:44) >  No radiographic evidence of acute cardiopulmonary disease.    < end of copied text >      PHYSICAL EXAM:  GENERAL: NAD, lying in bed comfortably  HEAD: Atraumatic, Normocephalic  EYES: EOMI, PERRLA, conjunctiva pink and cornea white  ENT: Normal external ears and nose, no discharges; moist mucous membranes, no erythema on posterior oropharynx  NECK: Supple, nontender to palpation; no JVD  CHEST/LUNG: Unlabored breathing, mild wheezing diffusely  HEART: Regular rate and rhythm; No murmurs, rubs, or gallops  ABDOMEN: Soft, nontender, nondistended; no rebound tenderness, no guarding; no hepatomegaly; normoactive bowel sounds  EXTREMITIES:  2+ Peripheral Pulses, brisk capillary refill. No clubbing, cyanosis, or petal edema  NERVOUS SYSTEM: Alert and oriented to person, time, place and situation, speech clear. No focal deficits   MSK: FROM all 4 extremities, full and equal strength  SKIN: No rashes or lesions      ADMISSION SUMMARY  Patient is a 35y old Male who presented with a chief complaint of shortness of breath  Currently admitted to medicine with the primary diagnosis of acute asthma exacerbation      ASSESSMENT/PLAN:    35 years old male from home with PMHx of asthma, presented to the ED with sudden episode of shortness of breath; Admitted for acute hypoxemic respiratory failure secondary to status asthmaticus.    # Acute hypoxemic respiratory failure secondary to status asthmaticus  - Improved with BiPAP, currently breathing much better; likely secondary to poorly managed disease and noncompliance  - No signs of PNA, CXR shows no focal consolidation; no need for antibiotics  - Continue IV Solumedrol 60mg q12hrs for now  - Continue Symbicort 160mg q12hrs, Duoneb q6hrs and albuterol PRN  - SARS-CoV-2 PCR negative; pending RVP and Flu PCR  - Check aspergillus antibodies as patient is a chronic asthmatics  - Check LE duplex  - Oxygen therapy as needed, keep O2 > 92%  - BiPAP as needed  - Education on cutting down Cannibis use and having regular pulmonary/PCP follow up  - Need social service assistance on getting inhalers outpatient      DVT ppx: Lovenox  GI ppx: Protonix  Diet: Regular  Activity: OOBTC  Ambulatory status: Ambulate without assistance  Lines: Peripheral IVs  Code status: FULL CODE  Dispo: Transfer to medical floor MELANIE CACERES 35y Male  MRN#: [id number]   CODE STATUS: FULL CODE    SUBJECTIVE  Patient is a 35y old Male who presented with a chief complaint of shortness of breath  Currently admitted to medicine with the primary diagnosis of acute asthma exacerbation  Today is hospital day 2d, and this morning he is resting comfortably and reports no overnight events. Patient is breathing much better today with mild wheezing. No need for BiPAP overnight.   no cp, abd pain, fever  sob improved, +wheezing, no cough, orthopnea        OBJECTIVE  PAST MEDICAL & SURGICAL HISTORY  Asthma  History of tonsillectomy    ALLERGIES:  No Known Allergies      HOME MEDICATIONS:  HOME MEDICATIONS:  albuterol:  (30 Jul 2020 09:51)      MEDICATIONS:  STANDING MEDICATIONS  albuterol/ipratropium for Nebulization 3 milliLiter(s) Nebulizer every 6 hours  budesonide 160 MICROgram(s)/formoterol 4.5 MICROgram(s) Inhaler 2 Puff(s) Inhalation two times a day  chlorhexidine 4% Liquid 1 Application(s) Topical <User Schedule>  enoxaparin Injectable 40 milliGRAM(s) SubCutaneous daily  methylPREDNISolone sodium succinate Injectable 60 milliGRAM(s) IV Push every 12 hours  pantoprazole    Tablet 40 milliGRAM(s) Oral before breakfast    PRN MEDICATIONS  ALBUTerol    90 MICROgram(s) HFA Inhaler 2 Puff(s) Inhalation every 6 hours PRN      VITAL SIGNS: Last 24 Hours  T(C): 36.1 (31 Jul 2020 07:01), Max: 37.2 (30 Jul 2020 16:00)  T(F): 96.9 (31 Jul 2020 07:01), Max: 99 (30 Jul 2020 16:00)  HR: 95 (31 Jul 2020 09:00) (65 - 100)  BP: 132/76 (31 Jul 2020 09:00) (108/63 - 144/79)  BP(mean): 98 (31 Jul 2020 09:00) (79 - 98)  RR: 16 (31 Jul 2020 09:00) (14 - 35)  SpO2: 93% (31 Jul 2020 09:00) (92% - 100%)    LABS:                        14.6   15.63 )-----------( 306      ( 31 Jul 2020 05:59 )             44.0     07-31    139  |  103  |  16  ----------------------------<  139<H>  5.4<H>   |  25  |  0.9    Ca    9.8      31 Jul 2020 05:59  Mg     2.1     07-31    TPro  7.0  /  Alb  4.5  /  TBili  0.4  /  DBili  x   /  AST  19  /  ALT  25  /  AlkPhos  69  07-31    RADIOLOGY:    < from: Xray Chest 1 View- PORTABLE-Routine (07.31.20 @ 05:44) >  No radiographic evidence of acute cardiopulmonary disease.    < end of copied text >      PHYSICAL EXAM:  GENERAL: NAD, lying in bed comfortably  HEAD: Atraumatic, Normocephalic  EYES: EOMI, PERRLA, conjunctiva pink and cornea white  ENT: Normal external ears and nose, no discharges; moist mucous membranes, no erythema on posterior oropharynx  NECK: Supple, nontender to palpation; no JVD  CHEST/LUNG: Unlabored breathing, mild wheezing diffusely  HEART: Regular rate and rhythm; No murmurs, rubs, or gallops  ABDOMEN: Soft, nontender, nondistended; no rebound tenderness, no guarding; no hepatomegaly; normoactive bowel sounds  EXTREMITIES:  2+ Peripheral Pulses, brisk capillary refill. No clubbing, cyanosis, or petal edema  NERVOUS SYSTEM: Alert and oriented to person, time, place and situation, speech clear. No focal deficits   MSK: FROM all 4 extremities, full and equal strength  SKIN: No rashes or lesions      ADMISSION SUMMARY  Patient is a 35y old Male who presented with a chief complaint of shortness of breath  Currently admitted to medicine with the primary diagnosis of acute asthma exacerbation      ASSESSMENT/PLAN:    35 years old male from home with PMHx of asthma, presented to the ED with sudden episode of shortness of breath; Admitted for acute hypoxemic respiratory failure secondary to status asthmaticus.    # Acute hypoxemic respiratory failure secondary to status asthmaticus  - Improved with BiPAP, currently breathing much better; likely secondary to poorly managed disease and noncompliance  - No signs of PNA, CXR shows no focal consolidation; no need for antibiotics  - Continue IV Solumedrol 60mg q12hrs for now  - Continue Symbicort 160mg q12hrs, Duoneb q6hrs and albuterol PRN  - SARS-CoV-2 PCR negative; pending RVP and Flu PCR  - Check aspergillus antibodies as patient is a chronic asthmatics  - Check LE duplex  - Oxygen therapy as needed, keep O2 > 92%  - BiPAP as needed  - Education on cutting down Cannibis use and having regular pulmonary/PCP follow up  - Need social service assistance on getting inhalers outpatient      DVT ppx: Lovenox  GI ppx: Protonix  Diet: Regular  Activity: OOBTC  Ambulatory status: Ambulate without assistance  Lines: Peripheral IVs  Code status: FULL CODE  Dispo: Transfer to medical floor

## 2020-08-01 VITALS
TEMPERATURE: 96 F | DIASTOLIC BLOOD PRESSURE: 79 MMHG | SYSTOLIC BLOOD PRESSURE: 125 MMHG | HEART RATE: 68 BPM | RESPIRATION RATE: 18 BRPM

## 2020-08-01 LAB
ALBUMIN SERPL ELPH-MCNC: 4.5 G/DL — SIGNIFICANT CHANGE UP (ref 3.5–5.2)
ALP SERPL-CCNC: 72 U/L — SIGNIFICANT CHANGE UP (ref 30–115)
ALT FLD-CCNC: 22 U/L — SIGNIFICANT CHANGE UP (ref 0–41)
ANION GAP SERPL CALC-SCNC: 11 MMOL/L — SIGNIFICANT CHANGE UP (ref 7–14)
AST SERPL-CCNC: 18 U/L — SIGNIFICANT CHANGE UP (ref 0–41)
BASOPHILS # BLD AUTO: 0.03 K/UL — SIGNIFICANT CHANGE UP (ref 0–0.2)
BASOPHILS NFR BLD AUTO: 0.1 % — SIGNIFICANT CHANGE UP (ref 0–1)
BILIRUB SERPL-MCNC: 0.3 MG/DL — SIGNIFICANT CHANGE UP (ref 0.2–1.2)
BUN SERPL-MCNC: 22 MG/DL — HIGH (ref 10–20)
CALCIUM SERPL-MCNC: 9.5 MG/DL — SIGNIFICANT CHANGE UP (ref 8.5–10.1)
CHLORIDE SERPL-SCNC: 102 MMOL/L — SIGNIFICANT CHANGE UP (ref 98–110)
CO2 SERPL-SCNC: 24 MMOL/L — SIGNIFICANT CHANGE UP (ref 17–32)
CREAT SERPL-MCNC: 1 MG/DL — SIGNIFICANT CHANGE UP (ref 0.7–1.5)
EOSINOPHIL # BLD AUTO: 0 K/UL — SIGNIFICANT CHANGE UP (ref 0–0.7)
EOSINOPHIL NFR BLD AUTO: 0 % — SIGNIFICANT CHANGE UP (ref 0–8)
GLUCOSE SERPL-MCNC: 112 MG/DL — HIGH (ref 70–99)
HCT VFR BLD CALC: 41.9 % — LOW (ref 42–52)
HGB BLD-MCNC: 14 G/DL — SIGNIFICANT CHANGE UP (ref 14–18)
IMM GRANULOCYTES NFR BLD AUTO: 1.1 % — HIGH (ref 0.1–0.3)
LYMPHOCYTES # BLD AUTO: 1.43 K/UL — SIGNIFICANT CHANGE UP (ref 1.2–3.4)
LYMPHOCYTES # BLD AUTO: 7.1 % — LOW (ref 20.5–51.1)
MAGNESIUM SERPL-MCNC: 2 MG/DL — SIGNIFICANT CHANGE UP (ref 1.8–2.4)
MCHC RBC-ENTMCNC: 30.8 PG — SIGNIFICANT CHANGE UP (ref 27–31)
MCHC RBC-ENTMCNC: 33.4 G/DL — SIGNIFICANT CHANGE UP (ref 32–37)
MCV RBC AUTO: 92.3 FL — SIGNIFICANT CHANGE UP (ref 80–94)
MONOCYTES # BLD AUTO: 0.93 K/UL — HIGH (ref 0.1–0.6)
MONOCYTES NFR BLD AUTO: 4.6 % — SIGNIFICANT CHANGE UP (ref 1.7–9.3)
NEUTROPHILS # BLD AUTO: 17.6 K/UL — HIGH (ref 1.4–6.5)
NEUTROPHILS NFR BLD AUTO: 87.1 % — HIGH (ref 42.2–75.2)
NRBC # BLD: 0 /100 WBCS — SIGNIFICANT CHANGE UP (ref 0–0)
PLATELET # BLD AUTO: 297 K/UL — SIGNIFICANT CHANGE UP (ref 130–400)
POTASSIUM SERPL-MCNC: 4.7 MMOL/L — SIGNIFICANT CHANGE UP (ref 3.5–5)
POTASSIUM SERPL-SCNC: 4.7 MMOL/L — SIGNIFICANT CHANGE UP (ref 3.5–5)
PROT SERPL-MCNC: 6.7 G/DL — SIGNIFICANT CHANGE UP (ref 6–8)
RBC # BLD: 4.54 M/UL — LOW (ref 4.7–6.1)
RBC # FLD: 12 % — SIGNIFICANT CHANGE UP (ref 11.5–14.5)
SODIUM SERPL-SCNC: 137 MMOL/L — SIGNIFICANT CHANGE UP (ref 135–146)
WBC # BLD: 20.21 K/UL — HIGH (ref 4.8–10.8)
WBC # FLD AUTO: 20.21 K/UL — HIGH (ref 4.8–10.8)

## 2020-08-01 PROCEDURE — 99239 HOSP IP/OBS DSCHRG MGMT >30: CPT

## 2020-08-01 RX ORDER — BUDESONIDE AND FORMOTEROL FUMARATE DIHYDRATE 160; 4.5 UG/1; UG/1
2 AEROSOL RESPIRATORY (INHALATION)
Qty: 14 | Refills: 0
Start: 2020-08-01 | End: 2020-08-14

## 2020-08-01 RX ORDER — EPINEPHRINE 0.3 MG/.3ML
3 INJECTION INTRAMUSCULAR; SUBCUTANEOUS
Qty: 168 | Refills: 0
Start: 2020-08-01 | End: 2020-08-14

## 2020-08-01 RX ORDER — ALBUTEROL 90 UG/1
0 AEROSOL, METERED ORAL
Qty: 0 | Refills: 0 | DISCHARGE

## 2020-08-01 RX ADMIN — BUDESONIDE AND FORMOTEROL FUMARATE DIHYDRATE 2 PUFF(S): 160; 4.5 AEROSOL RESPIRATORY (INHALATION) at 09:18

## 2020-08-01 RX ADMIN — PANTOPRAZOLE SODIUM 40 MILLIGRAM(S): 20 TABLET, DELAYED RELEASE ORAL at 06:39

## 2020-08-01 RX ADMIN — Medication 60 MILLIGRAM(S): at 05:16

## 2020-08-01 RX ADMIN — CHLORHEXIDINE GLUCONATE 1 APPLICATION(S): 213 SOLUTION TOPICAL at 05:16

## 2020-08-01 RX ADMIN — SODIUM ZIRCONIUM CYCLOSILICATE 10 GRAM(S): 10 POWDER, FOR SUSPENSION ORAL at 05:16

## 2020-08-01 NOTE — DISCHARGE NOTE PROVIDER - NSDCMRMEDTOKEN_GEN_ALL_CORE_FT
albuterol 1.25 mg/3 mL (0.042%) inhalation solution: 3 milliliter(s) by nebulizer every 6 hours, As Needed   budesonide-formoterol 160 mcg-4.5 mcg/inh inhalation aerosol: 2 puff(s) inhaled 2 times a day   predniSONE 20 mg oral tablet: 3 tab(s) orally once a day for 3 days then  2 tabs orally once a day for 3 days then  1 tab daily for 3 days

## 2020-08-01 NOTE — PROGRESS NOTE ADULT - ASSESSMENT
35M PMHx asthma here with acute hypoxic resp failure due to asthma exacerbation.    #Asthma exacerbation  no hypoxia, satting well on ra  change solumedrol to prednisone taper  no wheeze on exam  pt requesting for d/c today  albuterol neb, symbicort on d/c  outpt pulm f/u one week  #DVT ppx  d/c today

## 2020-08-01 NOTE — DISCHARGE NOTE PROVIDER - NSDCCPCAREPLAN_GEN_ALL_CORE_FT
PRINCIPAL DISCHARGE DIAGNOSIS  Diagnosis: Status asthmaticus  Assessment and Plan of Treatment: PLEASE FOLLOW UP WITH YOUR PULMONOLOGIST, PRIMARY CARE DOCTOR IN ONE WEEK.

## 2020-08-01 NOTE — PROGRESS NOTE ADULT - SUBJECTIVE AND OBJECTIVE BOX
INTERVAL HPI/OVERNIGHT EVENTS:    SUBJECTIVE: Patient seen and examined at bedside.     no cp, sob, abd pain, fever  no sob, orthopnea, pnd, cough    OBJECTIVE:    VITAL SIGNS:  Vital Signs Last 24 Hrs  T(C): 35.6 (01 Aug 2020 05:00), Max: 36.8 (31 Jul 2020 21:00)  T(F): 96 (01 Aug 2020 05:00), Max: 98.3 (31 Jul 2020 21:00)  HR: 68 (01 Aug 2020 05:00) (68 - 97)  BP: 125/79 (01 Aug 2020 05:00) (125/79 - 158/72)  BP(mean): --  RR: 18 (01 Aug 2020 05:00) (16 - 18)  SpO2: 96% (31 Jul 2020 11:00) (96% - 96%)      PHYSICAL EXAM:    General: NAD  HEENT: NC/AT; PERRL, clear conjunctiva  Neck: supple  Respiratory: CTA b/l  Cardiovascular: +S1/S2; RRR  Abdomen: soft, NT/ND; +BS x4  Extremities: WWP, 2+ peripheral pulses b/l; no LE edema  Skin: normal color and turgor; no rash  Neurological:    MEDICATIONS:  MEDICATIONS  (STANDING):  albuterol/ipratropium for Nebulization 3 milliLiter(s) Nebulizer every 6 hours  budesonide 160 MICROgram(s)/formoterol 4.5 MICROgram(s) Inhaler 2 Puff(s) Inhalation two times a day  chlorhexidine 4% Liquid 1 Application(s) Topical <User Schedule>  enoxaparin Injectable 40 milliGRAM(s) SubCutaneous daily  methylPREDNISolone sodium succinate Injectable 60 milliGRAM(s) IV Push every 12 hours  pantoprazole    Tablet 40 milliGRAM(s) Oral before breakfast    MEDICATIONS  (PRN):  acetaminophen   Tablet .. 650 milliGRAM(s) Oral every 6 hours PRN Moderate Pain (4 - 6)  ALBUTerol    90 MICROgram(s) HFA Inhaler 2 Puff(s) Inhalation every 6 hours PRN Shortness of Breath and/or Wheezing      ALLERGIES:  Allergies    No Known Allergies    Intolerances        LABS:                        14.6   15.63 )-----------( 306      ( 31 Jul 2020 05:59 )             44.0     Hemoglobin: 14.6 g/dL (07-31 @ 05:59)  Hemoglobin: 14.8 g/dL (07-30 @ 09:30)    CBC Full  -  ( 31 Jul 2020 05:59 )  WBC Count : 15.63 K/uL  RBC Count : 4.78 M/uL  Hemoglobin : 14.6 g/dL  Hematocrit : 44.0 %  Platelet Count - Automated : 306 K/uL  Mean Cell Volume : 92.1 fL  Mean Cell Hemoglobin : 30.5 pg  Mean Cell Hemoglobin Concentration : 33.2 g/dL  Auto Neutrophil # : 13.36 K/uL  Auto Lymphocyte # : 1.74 K/uL  Auto Monocyte # : 0.40 K/uL  Auto Eosinophil # : 0.00 K/uL  Auto Basophil # : 0.02 K/uL  Auto Neutrophil % : 85.5 %  Auto Lymphocyte % : 11.1 %  Auto Monocyte % : 2.6 %  Auto Eosinophil % : 0.0 %  Auto Basophil % : 0.1 %    07-31    137  |  103  |  18  ----------------------------<  121<H>  4.6   |  20  |  1.0    Ca    10.1      31 Jul 2020 14:48  Mg     2.1     07-31    TPro  7.0  /  Alb  4.5  /  TBili  0.4  /  DBili  x   /  AST  19  /  ALT  25  /  AlkPhos  69  07-31    Creatinine Trend: 1.0<--, 0.9<--, 1.2<--  LIVER FUNCTIONS - ( 31 Jul 2020 05:59 )  Alb: 4.5 g/dL / Pro: 7.0 g/dL / ALK PHOS: 69 U/L / ALT: 25 U/L / AST: 19 U/L / GGT: x               hs Troponin:              CSF:                      EKG:   MICROBIOLOGY:    IMAGING:      Labs, imaging, EKG personally reviewed    RADIOLOGY & ADDITIONAL TESTS: Reviewed.

## 2020-08-01 NOTE — DISCHARGE NOTE PROVIDER - HOSPITAL COURSE
35M PMHx asthma here with acute hypoxic resp failure due to asthma exacerbation. Admitted to icu for monitoring, started on solumedrol. CXR unremarkable. Hypoxia resolved. He was transferred to floors. He requested to be d/c on 8/1, despite recommendation to cont monitoring in hospital; he assumes risk of leaving prematurely including but not limited to death. He will be d/c on prednisone taper, needs outpt pmd, pulm f/u one week.        31 minutes spent on discharge planning.

## 2020-08-01 NOTE — DISCHARGE NOTE NURSING/CASE MANAGEMENT/SOCIAL WORK - PATIENT PORTAL LINK FT
You can access the FollowMyHealth Patient Portal offered by [facility] by registering at the following website: [email or web address]. By joining [facility]’s FollowMyHealth portal, you will also be able to view your health information using other applications (apps) compatible with our system.

## 2020-08-01 NOTE — DISCHARGE NOTE PROVIDER - CARE PROVIDER_API CALL
Brendon Mercado (MD)  Internal Medicine; Pulmonary Disease  [address] [place] [address], [place] [address]  Phone: [phone]  Fax: [phone]  Follow Up Time:     Asim Floyd ()  Medicine  Physicians  [address] [place] [address]  Phone: [phone]  Fax: [phone]  Follow Up Time:

## 2020-08-04 DIAGNOSIS — Z87.891 PERSONAL HISTORY OF NICOTINE DEPENDENCE: ICD-10-CM

## 2020-08-04 DIAGNOSIS — J45.902 UNSPECIFIED ASTHMA WITH STATUS ASTHMATICUS: ICD-10-CM

## 2020-08-04 DIAGNOSIS — J45.901 UNSPECIFIED ASTHMA WITH (ACUTE) EXACERBATION: ICD-10-CM

## 2020-08-04 DIAGNOSIS — J96.01 ACUTE RESPIRATORY FAILURE WITH HYPOXIA: ICD-10-CM

## 2020-08-05 LAB
A FLAVUS AB FLD QL: NEGATIVE — SIGNIFICANT CHANGE UP
A NIGER AB FLD QL: NEGATIVE — SIGNIFICANT CHANGE UP
A NIGER AB FLD QL: NEGATIVE — SIGNIFICANT CHANGE UP

## 2023-03-06 NOTE — PROGRESS NOTE ADULT - ATTENDING COMMENTS
#Asthma exacerbation  no hypoxia, satting well on ra  solumedrol 60 bid  duoneb q6  symbicort  f/u pulm  cxr clear #Asthma exacerbation  no hypoxia, satting well on ra  solumedrol 60 bid  duoneb q6  symbicort  f/u pulm  cxr clear    #Progress Note Handoff:  Pending (specify):  Consults_________, Tests________, Test Results_______, Other_____asthma____  Family discussion: d/w pt at bedside re: treatment plan, primary dx  Disposition: Home_x__/SNF___/Other________/Unknown at this time________ [place]remf[place] Counseling: I discussed with the patient the risks of guselkumab including but not limited to immunosuppression, serious infections, and drug reactions.  The patient understands that monitoring is required including a PPD at baseline and must alert us or the primary physician if symptoms of infection or other concerning signs are noted.

## 2024-01-16 NOTE — ED PROVIDER NOTE - ADMIT DISPOSITION PRESENT ON ADMISSION SEPSIS
